# Patient Record
(demographics unavailable — no encounter records)

---

## 2025-06-18 NOTE — HISTORY OF PRESENT ILLNESS
[FreeTextEntry1] : I saw this patient in the office today. She presents with her sister and her fiance.  She was originally seen at [facility] on 6/10/2025. She had presented with dizziness and numbness of the right side. MRI was suspicious for multiple sclerosis. Cerebrospinal fluid was supportive of the diagnosis. She was treated with intravenous steroids and ultimately discharged. She reports that since her discharge she has had worsening of her symptoms. She developed visual obscuration, worsening numbness, and balance difficulty.

## 2025-06-18 NOTE — PHYSICAL EXAM
[General Appearance - Alert] : alert [General Appearance - In No Acute Distress] : in no acute distress [Oriented To Time, Place, And Person] : oriented to person, place, and time [Affect] : the affect was normal [Memory Recent] : recent memory was not impaired [Memory Remote] : remote memory was not impaired [Cranial Nerves Trigeminal (V)] : facial sensation intact symmetrically [Cranial Nerves Facial (VII)] : face symmetrical [Cranial Nerves Vestibulocochlear (VIII)] : hearing was intact bilaterally [Cranial Nerves Glossopharyngeal (IX)] : tongue and palate midline [Cranial Nerves Accessory (XI - Cranial And Spinal)] : head turning and shoulder shrug symmetric [Cranial Nerves Hypoglossal (XII)] : there was no tongue deviation with protrusion [Motor Tone] : muscle tone was normal in all four extremities [Sensation Vibration Decrease] : vibration was intact [Limited Balance] : the patient's balance was impaired [2+] : Patella left 2+ [Motor Strength] : muscle strength was normal in all four extremities [Optic Disc Abnormality] : the optic disc were normal in size and color [Dysarthria] : no dysarthria [Aphasia] : no dysphasia/aphasia [Coordination - Dysmetria Impaired Finger-to-Nose Bilateral] : not present [Plantar Reflex Right Only] : normal on the right [Plantar Reflex Left Only] : normal on the left [FreeTextEntry8] : She presented in a wheelchair today.

## 2025-06-18 NOTE — ASSESSMENT
[FreeTextEntry1] : This is a 38-year-old woman with what appears to be multiple sclerosis. I had an extensive discussion with her in the hospital and again here today regarding treatment options. I would like to start her on Tecfidera. We have filled out the paperwork for this.  I have explained that should the symptoms not improve she will have to return to the hospital for further evaluation.  I will see her back in a few months otherwise.

## 2025-06-18 NOTE — DATA REVIEWED
[de-identified] : Brain MRI was performed on 6/10/2025. The study demonstrated multiple white matter lesions in the cerebral hemispheres and posterior fossa.  The anatomic distribution was suggestive of multiple sclerosis. [de-identified] : Cerebrospinal fluid demonstrated 0 white cells, and 2 red cells.  Protein was 42 and glucose was 57. Myelin basic protein was markedly elevated at 81.9.  She is varicella immune (the IgM was borderline but more toward the negative range. QuantiFERON-TB was noted to be indeterminate negative.

## 2025-06-18 NOTE — CONSULT LETTER
[Courtesy Letter:] : I had the pleasure of seeing your patient, [unfilled], in my office today. [Please see my note below.] : Please see my note below. [Consult Closing:] : Thank you very much for allowing me to participate in the care of this patient.  If you have any questions, please do not hesitate to contact me. [Sincerely,] : Sincerely, [Dear  ___] : Dear  [unfilled], [FreeTextEntry3] : Gabe Bennett MD.